# Patient Record
Sex: MALE | Race: WHITE | NOT HISPANIC OR LATINO | Employment: FULL TIME | URBAN - METROPOLITAN AREA
[De-identification: names, ages, dates, MRNs, and addresses within clinical notes are randomized per-mention and may not be internally consistent; named-entity substitution may affect disease eponyms.]

---

## 2017-11-27 DIAGNOSIS — Z00.00 HEALTH CARE MAINTENANCE: ICD-10-CM

## 2017-11-27 DIAGNOSIS — Z00.00 ANNUAL PHYSICAL EXAM: Primary | ICD-10-CM

## 2017-11-28 LAB
25(OH)D3+25(OH)D2 SERPL-MCNC: 18.9 NG/ML
ALBUMIN SERPL-MCNC: 4.4 G/DL (ref 3.5–5.2)
ALBUMIN/GLOB SERPL: 2.2 G/DL
ALP SERPL-CCNC: 43 U/L (ref 40–129)
ALT SERPL-CCNC: 23 U/L (ref 5–41)
AST SERPL-CCNC: 22 U/L (ref 5–40)
BASOPHILS # BLD AUTO: 0.02 10*3/MM3 (ref 0–0.2)
BASOPHILS NFR BLD AUTO: 0.5 % (ref 0–2)
BILIRUB SERPL-MCNC: 0.8 MG/DL (ref 0.2–1.2)
BUN SERPL-MCNC: 14 MG/DL (ref 6–20)
BUN/CREAT SERPL: 15.7 (ref 7–25)
CALCIUM SERPL-MCNC: 9.1 MG/DL (ref 8.6–10.5)
CHLORIDE SERPL-SCNC: 102 MMOL/L (ref 98–107)
CHOLEST SERPL-MCNC: 125 MG/DL (ref 0–200)
CO2 SERPL-SCNC: 27.5 MMOL/L (ref 22–29)
CREAT SERPL-MCNC: 0.89 MG/DL (ref 0.76–1.27)
EOSINOPHIL # BLD AUTO: 0.1 10*3/MM3 (ref 0.1–0.3)
EOSINOPHIL NFR BLD AUTO: 2.6 % (ref 0–4)
ERYTHROCYTE [DISTWIDTH] IN BLOOD BY AUTOMATED COUNT: 12.4 % (ref 11.5–14.5)
GFR SERPLBLD CREATININE-BSD FMLA CKD-EPI: 104 ML/MIN/1.73
GFR SERPLBLD CREATININE-BSD FMLA CKD-EPI: 126 ML/MIN/1.73
GLOBULIN SER CALC-MCNC: 2 GM/DL
GLUCOSE SERPL-MCNC: 83 MG/DL (ref 65–99)
HCT VFR BLD AUTO: 42.1 % (ref 42–52)
HDLC SERPL-MCNC: 63 MG/DL (ref 40–60)
HGB BLD-MCNC: 14.8 G/DL (ref 14–18)
IMM GRANULOCYTES # BLD: 0.01 10*3/MM3 (ref 0–0.03)
IMM GRANULOCYTES NFR BLD: 0.3 % (ref 0–0.5)
LDLC SERPL CALC-MCNC: 53 MG/DL (ref 0–100)
LYMPHOCYTES # BLD AUTO: 1.69 10*3/MM3 (ref 0.6–4.8)
LYMPHOCYTES NFR BLD AUTO: 43.6 % (ref 20–45)
MCH RBC QN AUTO: 29.2 PG (ref 27–31)
MCHC RBC AUTO-ENTMCNC: 35.2 G/DL (ref 31–37)
MCV RBC AUTO: 83 FL (ref 80–94)
MONOCYTES # BLD AUTO: 0.36 10*3/MM3 (ref 0–1)
MONOCYTES NFR BLD AUTO: 9.3 % (ref 3–8)
NEUTROPHILS # BLD AUTO: 1.7 10*3/MM3 (ref 1.5–8.3)
NEUTROPHILS NFR BLD AUTO: 43.7 % (ref 45–70)
NRBC BLD AUTO-RTO: 0 /100 WBC (ref 0–0)
PLATELET # BLD AUTO: 187 10*3/MM3 (ref 140–500)
POTASSIUM SERPL-SCNC: 4 MMOL/L (ref 3.5–5.2)
PROT SERPL-MCNC: 6.4 G/DL (ref 6–8.5)
RBC # BLD AUTO: 5.07 10*6/MM3 (ref 4.7–6.1)
SODIUM SERPL-SCNC: 141 MMOL/L (ref 136–145)
TRIGL SERPL-MCNC: 45 MG/DL (ref 0–150)
VLDLC SERPL CALC-MCNC: 9 MG/DL (ref 8–32)
WBC # BLD AUTO: 3.88 10*3/MM3 (ref 4.8–10.8)

## 2017-12-07 ENCOUNTER — OFFICE VISIT (OUTPATIENT)
Dept: FAMILY MEDICINE CLINIC | Facility: CLINIC | Age: 27
End: 2017-12-07

## 2017-12-07 VITALS
OXYGEN SATURATION: 99 % | WEIGHT: 151 LBS | HEIGHT: 70 IN | SYSTOLIC BLOOD PRESSURE: 120 MMHG | HEART RATE: 110 BPM | BODY MASS INDEX: 21.62 KG/M2 | DIASTOLIC BLOOD PRESSURE: 72 MMHG

## 2017-12-07 DIAGNOSIS — Z00.00 HEALTHCARE MAINTENANCE: Primary | ICD-10-CM

## 2017-12-07 DIAGNOSIS — Z00.00 ANNUAL PHYSICAL EXAM: ICD-10-CM

## 2017-12-07 DIAGNOSIS — E55.9 VITAMIN D DEFICIENCY: ICD-10-CM

## 2017-12-07 DIAGNOSIS — D72.819 LEUKOPENIA, UNSPECIFIED TYPE: ICD-10-CM

## 2017-12-07 LAB
BILIRUB BLD-MCNC: NEGATIVE MG/DL
CLARITY, POC: CLEAR
COLOR UR: YELLOW
GLUCOSE UR STRIP-MCNC: NEGATIVE MG/DL
KETONES UR QL: NEGATIVE
LEUKOCYTE EST, POC: NEGATIVE
NITRITE UR-MCNC: NEGATIVE MG/ML
PH UR: 7 [PH] (ref 5–8)
PROT UR STRIP-MCNC: NEGATIVE MG/DL
RBC # UR STRIP: NEGATIVE /UL
SP GR UR: 1.01 (ref 1–1.03)
UROBILINOGEN UR QL: NORMAL

## 2017-12-07 PROCEDURE — 81002 URINALYSIS NONAUTO W/O SCOPE: CPT | Performed by: FAMILY MEDICINE

## 2017-12-07 PROCEDURE — 99395 PREV VISIT EST AGE 18-39: CPT | Performed by: FAMILY MEDICINE

## 2017-12-07 NOTE — PROGRESS NOTES
Subjective   Rajesh Marrero is a 27 y.o. male with   Chief Complaint   Patient presents with   • Annual Exam   .    History of Present Illness   Patient presents for a complete physical.  At this time, he is otherwise without complaint. He is working full time at [employer] and continues to live at home with his parents.  Fasting labs have been performed prior to this visit.  The following portions of the patient's history were reviewed and updated as appropriate: allergies, current medications, past family history, past medical history, past social history, past surgical history and problem list.    Review of Systems   Endocrine:        Vitamin D Deficiency   All other systems reviewed and are negative.      Objective     Vitals:    12/07/17 1450   BP: 120/72   Pulse: 110   SpO2: 99%       BP Readings from Last 3 Encounters:   12/07/17 120/72   08/19/15 120/78      Wt Readings from Last 3 Encounters:   12/07/17 68.5 kg (151 lb)   08/19/15 66.7 kg (147 lb)      Office Visit on 12/07/2017   Component Date Value Ref Range Status   • Color 12/07/2017 Yellow  Yellow, Straw, Dark Yellow, Radha Final   • Clarity, UA 12/07/2017 Clear  Clear Final   • Glucose, UA 12/07/2017 Negative  Negative, 1000 mg/dL (3+) mg/dL Final   • Bilirubin 12/07/2017 Negative  Negative Final   • Ketones, UA 12/07/2017 Negative  Negative Final   • Specific Gravity  12/07/2017 1.010  1.005 - 1.030 Final   • Blood, UA 12/07/2017 Negative  Negative Final   • pH, Urine 12/07/2017 7.0  5.0 - 8.0 Final   • Protein, POC 12/07/2017 Negative  Negative mg/dL Final   • Urobilinogen, UA 12/07/2017 Normal  Normal Final   • Leukocytes 12/07/2017 Negative  Negative Final   • Nitrite, UA 12/07/2017 Negative  Negative Final   Orders Only on 11/27/2017   Component Date Value Ref Range Status   • WBC 11/28/2017 3.88* 4.80 - 10.80 10*3/mm3 Final   • RBC 11/28/2017 5.07  4.70 - 6.10 10*6/mm3 Final   • Hemoglobin 11/28/2017 14.8  14.0 - 18.0 g/dL Final   •  Hematocrit 11/28/2017 42.1  42.0 - 52.0 % Final   • MCV 11/28/2017 83.0  80.0 - 94.0 fL Final   • MCH 11/28/2017 29.2  27.0 - 31.0 pg Final   • MCHC 11/28/2017 35.2  31.0 - 37.0 g/dL Final   • RDW 11/28/2017 12.4  11.5 - 14.5 % Final   • Platelets 11/28/2017 187  140 - 500 10*3/mm3 Final   • Neutrophil Rel % 11/28/2017 43.7* 45.0 - 70.0 % Final   • Lymphocyte Rel % 11/28/2017 43.6  20.0 - 45.0 % Final   • Monocyte Rel % 11/28/2017 9.3* 3.0 - 8.0 % Final   • Eosinophil Rel % 11/28/2017 2.6  0.0 - 4.0 % Final   • Basophil Rel % 11/28/2017 0.5  0.0 - 2.0 % Final   • Neutrophils Absolute 11/28/2017 1.70  1.50 - 8.30 10*3/mm3 Final   • Lymphocytes Absolute 11/28/2017 1.69  0.60 - 4.80 10*3/mm3 Final   • Monocytes Absolute 11/28/2017 0.36  0.00 - 1.00 10*3/mm3 Final   • Eosinophils Absolute 11/28/2017 0.10  0.10 - 0.30 10*3/mm3 Final   • Basophils Absolute 11/28/2017 0.02  0.00 - 0.20 10*3/mm3 Final   • Immature Granulocyte Rel % 11/28/2017 0.3  0.0 - 0.5 % Final   • Immature Grans Absolute 11/28/2017 0.01  0.00 - 0.03 10*3/mm3 Final   • nRBC 11/28/2017 0.0  0.0 - 0.0 /100 WBC Final   • Glucose 11/28/2017 83  65 - 99 mg/dL Final   • BUN 11/28/2017 14  6 - 20 mg/dL Final   • Creatinine 11/28/2017 0.89  0.76 - 1.27 mg/dL Final   • eGFR Non African Am 11/28/2017 104  >60 mL/min/1.73 Final   • eGFR African Am 11/28/2017 126  >60 mL/min/1.73 Final   • BUN/Creatinine Ratio 11/28/2017 15.7  7.0 - 25.0 Final   • Sodium 11/28/2017 141  136 - 145 mmol/L Final   • Potassium 11/28/2017 4.0  3.5 - 5.2 mmol/L Final   • Chloride 11/28/2017 102  98 - 107 mmol/L Final   • Total CO2 11/28/2017 27.5  22.0 - 29.0 mmol/L Final   • Calcium 11/28/2017 9.1  8.6 - 10.5 mg/dL Final   • Total Protein 11/28/2017 6.4  6.0 - 8.5 g/dL Final   • Albumin 11/28/2017 4.40  3.50 - 5.20 g/dL Final   • Globulin 11/28/2017 2.0  gm/dL Final   • A/G Ratio 11/28/2017 2.2  g/dL Final   • Total Bilirubin 11/28/2017 0.8  0.2 - 1.2 mg/dL Final   • Alkaline  Phosphatase 11/28/2017 43  40 - 129 U/L Final   • AST (SGOT) 11/28/2017 22  5 - 40 U/L Final   • ALT (SGPT) 11/28/2017 23  5 - 41 U/L Final   • Total Cholesterol 11/28/2017 125  0 - 200 mg/dL Final   • Triglycerides 11/28/2017 45  0 - 150 mg/dL Final   • HDL Cholesterol 11/28/2017 63* 40 - 60 mg/dL Final   • VLDL Cholesterol 11/28/2017 9  8 - 32 mg/dL Final   • LDL Cholesterol  11/28/2017 53  0 - 100 mg/dL Final   • 25 Hydroxy, Vitamin D 11/28/2017 18.9  ng/mL Final    Comment: Reference Range for Total Vitamin D 25(OH)  Deficiency    <20.0 ng/mL  Insufficiency 21-29 ng/mL  Sufficiency   30-74 ng/mL         Physical Exam   Constitutional: He is oriented to person, place, and time. Vital signs are normal. He appears well-developed and well-nourished. No distress.   HENT:   Head: Normocephalic and atraumatic.   Right Ear: Hearing, tympanic membrane, external ear and ear canal normal.   Left Ear: Hearing, tympanic membrane, external ear and ear canal normal.   Nose: Nose normal.   Mouth/Throat: Uvula is midline, oropharynx is clear and moist and mucous membranes are normal.   Eyes: Conjunctivae, EOM and lids are normal. Pupils are equal, round, and reactive to light. No scleral icterus.   Fundoscopic exam:       The right eye shows no AV nicking, no exudate, no hemorrhage and no papilledema.        The left eye shows no AV nicking, no exudate, no hemorrhage and no papilledema.   Neck: Trachea normal and normal range of motion. Neck supple. No JVD present. No muscular tenderness present. Carotid bruit is not present. Normal range of motion present. No thyroid mass and no thyromegaly present.   Cardiovascular: Normal rate, regular rhythm, S1 normal, S2 normal, normal heart sounds, intact distal pulses and normal pulses.  PMI is not displaced.  Exam reveals no gallop and no friction rub.    No murmur heard.  Pulses:       Carotid pulses are 2+ on the right side, and 2+ on the left side.       Radial pulses are 2+ on the  right side, and 2+ on the left side.   Pulmonary/Chest: Effort normal and breath sounds normal. He has no decreased breath sounds. He has no wheezes. He has no rhonchi. He has no rales.   Abdominal: Soft. Bowel sounds are normal. He exhibits no distension and no mass. There is no hepatosplenomegaly. There is no tenderness. There is no rigidity, no rebound, no guarding and no CVA tenderness. No hernia. Hernia confirmed negative in the right inguinal area and confirmed negative in the left inguinal area.   Genitourinary: Testes normal and penis normal. Cremasteric reflex is present. Circumcised.   Musculoskeletal: Normal range of motion. He exhibits no edema, tenderness or deformity.   Lymphadenopathy:     He has no cervical adenopathy.   Neurological: He is alert and oriented to person, place, and time. He has normal strength and normal reflexes. He displays no tremor and normal reflexes. No cranial nerve deficit or sensory deficit. He exhibits normal muscle tone. He displays a negative Romberg sign. Coordination and gait normal.   Reflex Scores:       Bicep reflexes are 2+ on the right side and 2+ on the left side.       Brachioradialis reflexes are 2+ on the right side and 2+ on the left side.       Patellar reflexes are 2+ on the right side and 2+ on the left side.  Skin: Skin is warm and dry. No rash noted.   Psychiatric: He has a normal mood and affect. His speech is normal and behavior is normal. Judgment and thought content normal. Cognition and memory are normal.   Nursing note and vitals reviewed.      Assessment/Plan   Rajesh was seen today for annual exam.    Diagnoses and all orders for this visit:    Healthcare maintenance  -     POCT urinalysis dipstick, manual    Annual physical exam    Vitamin D deficiency    Leukopenia, unspecified type      Patient Instructions     Office Visit on 12/07/2017   Component Date Value Ref Range Status   • Color 12/07/2017 Yellow  Yellow, Straw, Dark Yellow, Radha  Final   • Clarity, UA 12/07/2017 Clear  Clear Final   • Glucose, UA 12/07/2017 Negative  Negative, 1000 mg/dL (3+) mg/dL Final   • Bilirubin 12/07/2017 Negative  Negative Final   • Ketones, UA 12/07/2017 Negative  Negative Final   • Specific Gravity  12/07/2017 1.010  1.005 - 1.030 Final   • Blood, UA 12/07/2017 Negative  Negative Final   • pH, Urine 12/07/2017 7.0  5.0 - 8.0 Final   • Protein, POC 12/07/2017 Negative  Negative mg/dL Final   • Urobilinogen, UA 12/07/2017 Normal  Normal Final   • Leukocytes 12/07/2017 Negative  Negative Final   • Nitrite, UA 12/07/2017 Negative  Negative Final   Orders Only on 11/27/2017   Component Date Value Ref Range Status   • WBC 11/28/2017 3.88* 4.80 - 10.80 10*3/mm3 Final   • RBC 11/28/2017 5.07  4.70 - 6.10 10*6/mm3 Final   • Hemoglobin 11/28/2017 14.8  14.0 - 18.0 g/dL Final   • Hematocrit 11/28/2017 42.1  42.0 - 52.0 % Final   • MCV 11/28/2017 83.0  80.0 - 94.0 fL Final   • MCH 11/28/2017 29.2  27.0 - 31.0 pg Final   • MCHC 11/28/2017 35.2  31.0 - 37.0 g/dL Final   • RDW 11/28/2017 12.4  11.5 - 14.5 % Final   • Platelets 11/28/2017 187  140 - 500 10*3/mm3 Final   • Neutrophil Rel % 11/28/2017 43.7* 45.0 - 70.0 % Final   • Lymphocyte Rel % 11/28/2017 43.6  20.0 - 45.0 % Final   • Monocyte Rel % 11/28/2017 9.3* 3.0 - 8.0 % Final   • Eosinophil Rel % 11/28/2017 2.6  0.0 - 4.0 % Final   • Basophil Rel % 11/28/2017 0.5  0.0 - 2.0 % Final   • Neutrophils Absolute 11/28/2017 1.70  1.50 - 8.30 10*3/mm3 Final   • Lymphocytes Absolute 11/28/2017 1.69  0.60 - 4.80 10*3/mm3 Final   • Monocytes Absolute 11/28/2017 0.36  0.00 - 1.00 10*3/mm3 Final   • Eosinophils Absolute 11/28/2017 0.10  0.10 - 0.30 10*3/mm3 Final   • Basophils Absolute 11/28/2017 0.02  0.00 - 0.20 10*3/mm3 Final   • Immature Granulocyte Rel % 11/28/2017 0.3  0.0 - 0.5 % Final   • Immature Grans Absolute 11/28/2017 0.01  0.00 - 0.03 10*3/mm3 Final   • nRBC 11/28/2017 0.0  0.0 - 0.0 /100 WBC Final   • Glucose 11/28/2017  83  65 - 99 mg/dL Final   • BUN 11/28/2017 14  6 - 20 mg/dL Final   • Creatinine 11/28/2017 0.89  0.76 - 1.27 mg/dL Final   • eGFR Non African Am 11/28/2017 104  >60 mL/min/1.73 Final   • eGFR African Am 11/28/2017 126  >60 mL/min/1.73 Final   • BUN/Creatinine Ratio 11/28/2017 15.7  7.0 - 25.0 Final   • Sodium 11/28/2017 141  136 - 145 mmol/L Final   • Potassium 11/28/2017 4.0  3.5 - 5.2 mmol/L Final   • Chloride 11/28/2017 102  98 - 107 mmol/L Final   • Total CO2 11/28/2017 27.5  22.0 - 29.0 mmol/L Final   • Calcium 11/28/2017 9.1  8.6 - 10.5 mg/dL Final   • Total Protein 11/28/2017 6.4  6.0 - 8.5 g/dL Final   • Albumin 11/28/2017 4.40  3.50 - 5.20 g/dL Final   • Globulin 11/28/2017 2.0  gm/dL Final   • A/G Ratio 11/28/2017 2.2  g/dL Final   • Total Bilirubin 11/28/2017 0.8  0.2 - 1.2 mg/dL Final   • Alkaline Phosphatase 11/28/2017 43  40 - 129 U/L Final   • AST (SGOT) 11/28/2017 22  5 - 40 U/L Final   • ALT (SGPT) 11/28/2017 23  5 - 41 U/L Final   • Total Cholesterol 11/28/2017 125  0 - 200 mg/dL Final   • Triglycerides 11/28/2017 45  0 - 150 mg/dL Final   • HDL Cholesterol 11/28/2017 63* 40 - 60 mg/dL Final   • VLDL Cholesterol 11/28/2017 9  8 - 32 mg/dL Final   • LDL Cholesterol  11/28/2017 53  0 - 100 mg/dL Final   • 25 Hydroxy, Vitamin D 11/28/2017 18.9  ng/mL Final    Comment: Reference Range for Total Vitamin D 25(OH)  Deficiency    <20.0 ng/mL  Insufficiency 21-29 ng/mL  Sufficiency   30-74 ng/mL       Start OTC Vitamin D3 at 2000 IU per day to improve Vitamin D Deficiency.         Return in about 1 year (around 12/7/2018) for Annual.        Scribed for Cooper Aguilar MD by Onofre Ramirez. 12/07/2017    I, Cooper Aguilar MD personally performed the services described in this documentation, as scribed by Onofre Ramirez in my presence, and it is both accurate and complete

## 2017-12-07 NOTE — PATIENT INSTRUCTIONS
Office Visit on 12/07/2017   Component Date Value Ref Range Status   • Color 12/07/2017 Yellow  Yellow, Straw, Dark Yellow, Radha Final   • Clarity, UA 12/07/2017 Clear  Clear Final   • Glucose, UA 12/07/2017 Negative  Negative, 1000 mg/dL (3+) mg/dL Final   • Bilirubin 12/07/2017 Negative  Negative Final   • Ketones, UA 12/07/2017 Negative  Negative Final   • Specific Gravity  12/07/2017 1.010  1.005 - 1.030 Final   • Blood, UA 12/07/2017 Negative  Negative Final   • pH, Urine 12/07/2017 7.0  5.0 - 8.0 Final   • Protein, POC 12/07/2017 Negative  Negative mg/dL Final   • Urobilinogen, UA 12/07/2017 Normal  Normal Final   • Leukocytes 12/07/2017 Negative  Negative Final   • Nitrite, UA 12/07/2017 Negative  Negative Final   Orders Only on 11/27/2017   Component Date Value Ref Range Status   • WBC 11/28/2017 3.88* 4.80 - 10.80 10*3/mm3 Final   • RBC 11/28/2017 5.07  4.70 - 6.10 10*6/mm3 Final   • Hemoglobin 11/28/2017 14.8  14.0 - 18.0 g/dL Final   • Hematocrit 11/28/2017 42.1  42.0 - 52.0 % Final   • MCV 11/28/2017 83.0  80.0 - 94.0 fL Final   • MCH 11/28/2017 29.2  27.0 - 31.0 pg Final   • MCHC 11/28/2017 35.2  31.0 - 37.0 g/dL Final   • RDW 11/28/2017 12.4  11.5 - 14.5 % Final   • Platelets 11/28/2017 187  140 - 500 10*3/mm3 Final   • Neutrophil Rel % 11/28/2017 43.7* 45.0 - 70.0 % Final   • Lymphocyte Rel % 11/28/2017 43.6  20.0 - 45.0 % Final   • Monocyte Rel % 11/28/2017 9.3* 3.0 - 8.0 % Final   • Eosinophil Rel % 11/28/2017 2.6  0.0 - 4.0 % Final   • Basophil Rel % 11/28/2017 0.5  0.0 - 2.0 % Final   • Neutrophils Absolute 11/28/2017 1.70  1.50 - 8.30 10*3/mm3 Final   • Lymphocytes Absolute 11/28/2017 1.69  0.60 - 4.80 10*3/mm3 Final   • Monocytes Absolute 11/28/2017 0.36  0.00 - 1.00 10*3/mm3 Final   • Eosinophils Absolute 11/28/2017 0.10  0.10 - 0.30 10*3/mm3 Final   • Basophils Absolute 11/28/2017 0.02  0.00 - 0.20 10*3/mm3 Final   • Immature Granulocyte Rel % 11/28/2017 0.3  0.0 - 0.5 % Final   • Immature  Grans Absolute 11/28/2017 0.01  0.00 - 0.03 10*3/mm3 Final   • nRBC 11/28/2017 0.0  0.0 - 0.0 /100 WBC Final   • Glucose 11/28/2017 83  65 - 99 mg/dL Final   • BUN 11/28/2017 14  6 - 20 mg/dL Final   • Creatinine 11/28/2017 0.89  0.76 - 1.27 mg/dL Final   • eGFR Non African Am 11/28/2017 104  >60 mL/min/1.73 Final   • eGFR African Am 11/28/2017 126  >60 mL/min/1.73 Final   • BUN/Creatinine Ratio 11/28/2017 15.7  7.0 - 25.0 Final   • Sodium 11/28/2017 141  136 - 145 mmol/L Final   • Potassium 11/28/2017 4.0  3.5 - 5.2 mmol/L Final   • Chloride 11/28/2017 102  98 - 107 mmol/L Final   • Total CO2 11/28/2017 27.5  22.0 - 29.0 mmol/L Final   • Calcium 11/28/2017 9.1  8.6 - 10.5 mg/dL Final   • Total Protein 11/28/2017 6.4  6.0 - 8.5 g/dL Final   • Albumin 11/28/2017 4.40  3.50 - 5.20 g/dL Final   • Globulin 11/28/2017 2.0  gm/dL Final   • A/G Ratio 11/28/2017 2.2  g/dL Final   • Total Bilirubin 11/28/2017 0.8  0.2 - 1.2 mg/dL Final   • Alkaline Phosphatase 11/28/2017 43  40 - 129 U/L Final   • AST (SGOT) 11/28/2017 22  5 - 40 U/L Final   • ALT (SGPT) 11/28/2017 23  5 - 41 U/L Final   • Total Cholesterol 11/28/2017 125  0 - 200 mg/dL Final   • Triglycerides 11/28/2017 45  0 - 150 mg/dL Final   • HDL Cholesterol 11/28/2017 63* 40 - 60 mg/dL Final   • VLDL Cholesterol 11/28/2017 9  8 - 32 mg/dL Final   • LDL Cholesterol  11/28/2017 53  0 - 100 mg/dL Final   • 25 Hydroxy, Vitamin D 11/28/2017 18.9  ng/mL Final    Comment: Reference Range for Total Vitamin D 25(OH)  Deficiency    <20.0 ng/mL  Insufficiency 21-29 ng/mL  Sufficiency   30-74 ng/mL       Start OTC Vitamin D3 at 2000 IU per day to improve Vitamin D Deficiency.

## 2017-12-10 PROBLEM — E55.9 VITAMIN D DEFICIENCY: Status: ACTIVE | Noted: 2017-12-10

## 2017-12-10 PROBLEM — D72.819 LEUKOPENIA: Status: ACTIVE | Noted: 2017-12-10

## 2018-01-31 ENCOUNTER — OFFICE VISIT (OUTPATIENT)
Dept: FAMILY MEDICINE CLINIC | Facility: CLINIC | Age: 28
End: 2018-01-31

## 2018-01-31 VITALS
BODY MASS INDEX: 21.76 KG/M2 | OXYGEN SATURATION: 99 % | HEIGHT: 70 IN | HEART RATE: 110 BPM | DIASTOLIC BLOOD PRESSURE: 60 MMHG | TEMPERATURE: 98.9 F | WEIGHT: 152 LBS | SYSTOLIC BLOOD PRESSURE: 122 MMHG

## 2018-01-31 DIAGNOSIS — S70.02XD CONTUSION OF LEFT HIP, SUBSEQUENT ENCOUNTER: ICD-10-CM

## 2018-01-31 DIAGNOSIS — S70.212D: ICD-10-CM

## 2018-01-31 DIAGNOSIS — S43.51XD SPRAIN OF RIGHT ACROMIOCLAVICULAR LIGAMENT, SUBSEQUENT ENCOUNTER: Primary | ICD-10-CM

## 2018-01-31 PROCEDURE — 99214 OFFICE O/P EST MOD 30 MIN: CPT | Performed by: FAMILY MEDICINE
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
## 2018-02-12 ENCOUNTER — OFFICE VISIT (OUTPATIENT)
Dept: ORTHOPEDIC SURGERY | Facility: CLINIC | Age: 28
End: 2018-02-12

## 2018-02-12 VITALS
DIASTOLIC BLOOD PRESSURE: 57 MMHG | HEIGHT: 70 IN | HEART RATE: 76 BPM | BODY MASS INDEX: 21.47 KG/M2 | SYSTOLIC BLOOD PRESSURE: 117 MMHG | WEIGHT: 150 LBS

## 2018-02-12 DIAGNOSIS — S39.011A STRAIN OF ABDOMINAL MUSCLE, INITIAL ENCOUNTER: ICD-10-CM

## 2018-02-12 DIAGNOSIS — R52 PAIN: Primary | ICD-10-CM

## 2018-02-12 DIAGNOSIS — S43.101A ACROMIOCLAVICULAR SEPARATION, RIGHT, INITIAL ENCOUNTER: ICD-10-CM

## 2018-02-12 PROCEDURE — 72170 X-RAY EXAM OF PELVIS: CPT | Performed by: ORTHOPAEDIC SURGERY

## 2018-02-12 PROCEDURE — 73030 X-RAY EXAM OF SHOULDER: CPT | Performed by: ORTHOPAEDIC SURGERY

## 2018-02-12 PROCEDURE — 99203 OFFICE O/P NEW LOW 30 MIN: CPT | Performed by: ORTHOPAEDIC SURGERY

## 2018-02-12 RX ORDER — CYCLOBENZAPRINE HCL 5 MG
5 TABLET ORAL 3 TIMES DAILY PRN
COMMUNITY
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
## 2018-02-21 ENCOUNTER — HOSPITAL ENCOUNTER (OUTPATIENT)
Dept: MRI IMAGING | Facility: HOSPITAL | Age: 28
Discharge: HOME | End: 2018-02-21
Attending: ORTHOPAEDIC SURGERY | Admitting: ORTHOPAEDIC SURGERY

## 2018-02-21 DIAGNOSIS — S43.101A ACROMIOCLAVICULAR SEPARATION, RIGHT, INITIAL ENCOUNTER: ICD-10-CM

## 2018-02-21 DIAGNOSIS — R52 PAIN: ICD-10-CM

## 2018-02-21 PROCEDURE — 73221 MRI JOINT UPR EXTREM W/O DYE: CPT

## 2018-02-26 ENCOUNTER — OFFICE VISIT (OUTPATIENT)
Dept: ORTHOPEDIC SURGERY | Facility: CLINIC | Age: 28
End: 2018-02-26

## 2018-02-26 DIAGNOSIS — S43.101A ACROMIOCLAVICULAR SEPARATION, RIGHT, INITIAL ENCOUNTER: Primary | ICD-10-CM

## 2018-02-26 PROCEDURE — 99213 OFFICE O/P EST LOW 20 MIN: CPT | Performed by: ORTHOPAEDIC SURGERY

## 2018-02-26 NOTE — PROGRESS NOTES
Subjective:grade 2-3  Separation right acromioclavicular joint     Patient ID: Rajesh Marrero is a 27 y.o. male.    Chief Complaint:    History of Present Illness 27-year-old male returns with results of MRI of the right shoulder.  I personally reviewed the MRI images and oriented which shows a grade 2-3 separation with complete disruption of the acromial clavicular joint partial tearing of the coracoclavicular ligament.  He continues to have moderate discomfort with any type of activity involving that shoulder.     Social History     Occupational History   • Not on file.     Social History Main Topics   • Smoking status: Never Smoker   • Smokeless tobacco: Current User     Types: Chew   • Alcohol use Yes      Comment: 6 drinks per week   • Drug use: No   • Sexual activity: Not on file      Review of Systems   Constitutional: Negative for chills, diaphoresis, fever and unexpected weight change.   HENT: Negative for hearing loss, nosebleeds, sore throat and tinnitus.    Eyes: Negative for pain and visual disturbance.   Respiratory: Negative for cough, shortness of breath and wheezing.    Cardiovascular: Negative for chest pain and palpitations.   Gastrointestinal: Negative for abdominal pain, diarrhea, nausea and vomiting.   Endocrine: Negative for cold intolerance, heat intolerance and polydipsia.   Genitourinary: Negative for difficulty urinating, dysuria and hematuria.   Musculoskeletal: Positive for arthralgias.   Skin: Negative for rash and wound.   Allergic/Immunologic: Negative for environmental allergies.   Neurological: Negative for dizziness, syncope and numbness.   Hematological: Does not bruise/bleed easily.   Psychiatric/Behavioral: Negative for dysphoric mood and sleep disturbance. The patient is not nervous/anxious.    All other systems reviewed and are negative.          Objective:     Ortho Exam  patient is alert and oriented ×3.  Review the results of the digital exam and the MRI with the  patient and his father.  Again he has elevation of the clavicle and this pain to palpation over the acromioclavicular joint.  There is no motor deficit no sensory loss.  Distal ulnar median nerve function is intact.  He has near full range of motion the shoulder but there is pain with abduction and extension against resistance is tenderness over the joint itself to palpation.  No sensory loss.  Deltoid and biceps function 5 over 5.  Skin is cool to touch.    Assessment:       1. Acromioclavicular separation, right, initial encounter          Plan:       spent over 10 minutes with  the patient and the father reviewing the MRI findings with diagram in the office showing him the anatomy.  He persists in having pain and discomfort in that shoulder.  who told her decision to proceed with surgery would be elected he still having pain discomfort and I felt that a 27 the best served with surgical reconstruction of that ligament.  I discussed the surgery with them risk and benefits length of rehabilitation recovery.  Total it had any questions to seek a second opinion.  Timing to proceed again with his decision to make.  The call back if they have any questions or fail decided to proceed with surgery under my direction.

## 2018-05-15 ENCOUNTER — TRANSCRIBE ORDERS (OUTPATIENT)
Dept: PHYSICAL THERAPY | Facility: CLINIC | Age: 28
End: 2018-05-15

## 2018-05-15 DIAGNOSIS — Z98.890 S/P SHOULDER SURGERY: ICD-10-CM

## 2018-05-15 DIAGNOSIS — S43.101A SEPARATION OF RIGHT ACROMIOCLAVICULAR JOINT, INITIAL ENCOUNTER: Primary | ICD-10-CM

## 2018-05-17 ENCOUNTER — TREATMENT (OUTPATIENT)
Dept: PHYSICAL THERAPY | Facility: CLINIC | Age: 28
End: 2018-05-17

## 2018-05-17 DIAGNOSIS — Z98.890 STATUS POST AC JOINT RECONSTRUCTION: Primary | ICD-10-CM

## 2018-05-17 PROCEDURE — 97161 PT EVAL LOW COMPLEX 20 MIN: CPT | Performed by: PHYSICAL THERAPIST

## 2018-05-17 PROCEDURE — 97110 THERAPEUTIC EXERCISES: CPT | Performed by: PHYSICAL THERAPIST

## 2018-05-17 PROCEDURE — 97140 MANUAL THERAPY 1/> REGIONS: CPT | Performed by: PHYSICAL THERAPIST
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
## 2018-05-23 ENCOUNTER — TREATMENT (OUTPATIENT)
Dept: PHYSICAL THERAPY | Facility: CLINIC | Age: 28
End: 2018-05-23

## 2018-05-23 DIAGNOSIS — Z98.890 STATUS POST AC JOINT RECONSTRUCTION: Primary | ICD-10-CM

## 2018-05-23 PROCEDURE — 97110 THERAPEUTIC EXERCISES: CPT | Performed by: PHYSICAL THERAPIST

## 2018-05-23 PROCEDURE — 97140 MANUAL THERAPY 1/> REGIONS: CPT | Performed by: PHYSICAL THERAPIST

## 2018-05-23 NOTE — PROGRESS NOTES
Physical Therapy Daily Progress Note    Time In 15:05  Time Out 15:38    Visit # : 2  Rajesh Marrero reports: shoulder has been doing a lot better since the first visit. Pt states when he sleeps on it it'll be sore, but other than that not really having any other issues.      Subjective     Objective   See Exercise, Manual, and Modality Logs for complete treatment.       Assessment & Plan     Assessment  Assessment details: Pt is doing well with all exercises and has full PROM all planes with minimal discomfort.  Pt tolerated all new strengthening exercises without difficulty.  Will continue to progress pt per pt's tolerance and as protocol advises.  Will continue to see pt 1-2x/week for strengthening and modalities PRN for pain.          Progress per Plan of Care           Manual Therapy:    8     mins  79466;  Therapeutic Exercise:   15      mins  60431;     Neuromuscular Alicja:        mins  95632;    Therapeutic Activity:          mins  14373;     Gait Training:           mins  59261;     Ultrasound:          mins  29027;    Electrical Stimulation:         mins  42671 ( );  Dry Needling          mins self-pay    Timed Treatment:  23    mins   Total Treatment:     33   mins    Alysha Pitt, PT  Physical Therapist

## 2018-05-29 ENCOUNTER — TREATMENT (OUTPATIENT)
Dept: PHYSICAL THERAPY | Facility: CLINIC | Age: 28
End: 2018-05-29

## 2018-05-29 DIAGNOSIS — Z98.890 STATUS POST AC JOINT RECONSTRUCTION: Primary | ICD-10-CM

## 2018-05-29 PROCEDURE — 97530 THERAPEUTIC ACTIVITIES: CPT | Performed by: PHYSICAL THERAPIST

## 2018-05-29 PROCEDURE — 97110 THERAPEUTIC EXERCISES: CPT | Performed by: PHYSICAL THERAPIST

## 2018-05-29 PROCEDURE — 97140 MANUAL THERAPY 1/> REGIONS: CPT | Performed by: PHYSICAL THERAPIST

## 2018-05-29 NOTE — PROGRESS NOTES
Physical Therapy Daily Progress Note    Time In 15:12  Time Out 16:05    Visit # : 3  Rajesh Marrero reports: it is a little sore today, but think I slept on it last night.    Subjective     Objective   See Exercise, Manual, and Modality Logs for complete treatment.       Assessment & Plan     Assessment  Assessment details: Pt is doing well with all exercises and has full PROM all planes with minimal discomfort.  Pt tolerated all new strengthening exercises without difficulty.  Will continue to progress pt per pt's tolerance and as protocol advises.  Will continue to see pt 1-2x/week for strengthening and modalities PRN for pain.          Progress per Plan of Care           Manual Therapy:    8     mins  46924;  Therapeutic Exercise:   15      mins  74290;     Neuromuscular Alicja:        mins  25093;    Therapeutic Activity:    15      mins  46423;     Gait Training:           mins  47115;     Ultrasound:          mins  03371;    Electrical Stimulation:         mins  82516 ( );  Dry Needling          mins self-pay    Timed Treatment:  38    mins   Total Treatment:     53   mins    Alysha Pitt, PT   Physical Therapist

## 2018-06-01 ENCOUNTER — TREATMENT (OUTPATIENT)
Dept: PHYSICAL THERAPY | Facility: CLINIC | Age: 28
End: 2018-06-01

## 2018-06-01 DIAGNOSIS — Z98.890 STATUS POST AC JOINT RECONSTRUCTION: Primary | ICD-10-CM

## 2018-06-01 PROCEDURE — 97110 THERAPEUTIC EXERCISES: CPT | Performed by: PHYSICAL THERAPIST

## 2018-06-01 NOTE — PROGRESS NOTES
Physical Therapy Daily Progress Note    Time In 16:42  Time Out 17:25    Visit # : 4  Rajesh Marrero reports: his shoulder is pretty good, but was a little sore after last visit.      Subjective     Objective   See Exercise, Manual, and Modality Logs for complete treatment.       Assessment & Plan     Assessment  Assessment details: Pt continues to respond well to treatment with some soreness from the new strengthening exercises.  Pt with good AROM all planes, but c/o some tightness after doing strengthening exercises.  Will continue to push strengthening exercises 2x/week.          Progress per Plan of Care           Manual Therapy:         mins  18805;  Therapeutic Exercise:   30      mins  39284;     Neuromuscular Alicja:        mins  70197;    Therapeutic Activity:          mins  71812;     Gait Training:           mins  87320;     Ultrasound:          mins  74020;    Electrical Stimulation:         mins  99100 ( );  Dry Needling          mins self-pay    Timed Treatment:   30  mins   Total Treatment:     43   mins    Alysha Pitt, PT  Physical Therapist

## 2018-06-07 ENCOUNTER — TREATMENT (OUTPATIENT)
Dept: PHYSICAL THERAPY | Facility: CLINIC | Age: 28
End: 2018-06-07

## 2018-06-07 DIAGNOSIS — Z98.890 STATUS POST AC JOINT RECONSTRUCTION: Primary | ICD-10-CM

## 2018-06-07 PROCEDURE — 97110 THERAPEUTIC EXERCISES: CPT | Performed by: PHYSICAL THERAPIST

## 2018-06-07 NOTE — PROGRESS NOTES
Physical Therapy Daily Progress Note    Time In 14:35  Time Out 15:22    Visit # : 5  Rajesh Holderjami reports: no issues or c/o of right shoulder pain since last treatment.      Subjective     Objective       Tenderness     Additional Tenderness Details  Pt denies having any point tenderness over the right AC joint     Active Range of Motion     Right Shoulder   Flexion: 155 degrees   Abduction: 170 degrees   External rotation 90°: 90 degrees  Internal rotation 90°: 71 degrees     Passive Range of Motion     Right Shoulder   Flexion: WFL  Abduction: WFL  External rotation 90°: WFL  Internal rotation 90°: WFL     See Exercise, Manual, and Modality Logs for complete treatment.       Assessment & Plan     Assessment  Assessment details: Pt is progressing well to treatment with good PROM and AROM all planes.  Pt denies any tenderness, but needs RC and scapular strengthening for lifting shoulder level and above.  Pt tolerated all increases in strengthening exercises well, but fatigues easily during body blade exercise.  Will continue to see 1-2x/week for strengthening.          Progress per Plan of Care           Manual Therapy:    5     mins  21316;  Therapeutic Exercise:   30      mins  17283;     Neuromuscular Alicja:        mins  21795;    Therapeutic Activity:          mins  50601;     Gait Training:           mins  68195;     Ultrasound:          mins  38146;    Electrical Stimulation:         mins  61771 ( );  Dry Needling          mins self-pay    Timed Treatment:  35    mins   Total Treatment:    47    mins    Alysha Pitt, PT  Physical Therapist

## 2018-06-12 ENCOUNTER — TREATMENT (OUTPATIENT)
Dept: PHYSICAL THERAPY | Facility: CLINIC | Age: 28
End: 2018-06-12

## 2018-06-12 DIAGNOSIS — Z98.890 STATUS POST AC JOINT RECONSTRUCTION: Primary | ICD-10-CM

## 2018-06-12 PROCEDURE — 97110 THERAPEUTIC EXERCISES: CPT | Performed by: PHYSICAL THERAPIST

## 2018-06-12 NOTE — PROGRESS NOTES
Physical Therapy Daily Progress Note    Time In 15:07  Time Out 16:02    Visit # : 6  Rajesh Marrero reports: the shoulder is doing good, but the shoulder was a little sore after last visit.      Subjective     Objective   See Exercise, Manual, and Modality Logs for complete treatment.       Assessment & Plan     Assessment  Assessment details: Pt is progressing well to treatment with good PROM and AROM all planes.  Pt denies any tenderness, but needs RC and scapular strengthening for lifting shoulder level and above.  Pt is tolerating all strengthening exercises well, but fatigues easily during body blade exercise.  Will continue to see 1-2x/week for strengthening.          Progress per Plan of Care           Manual Therapy:    5     mins  60152;  Therapeutic Exercise:   35      mins  73994;     Neuromuscular Alicja:        mins  53100;    Therapeutic Activity:          mins  30328;     Gait Training:           mins  34069;     Ultrasound:          mins  68508;    Electrical Stimulation:         mins  07447 ( );  Dry Needling          mins self-pay    Timed Treatment:  40    mins   Total Treatment:     55   mins    Alysha Pitt, PT  Physical Therapist

## 2018-06-19 ENCOUNTER — TREATMENT (OUTPATIENT)
Dept: PHYSICAL THERAPY | Facility: CLINIC | Age: 28
End: 2018-06-19

## 2018-06-19 DIAGNOSIS — Z98.890 STATUS POST AC JOINT RECONSTRUCTION: Primary | ICD-10-CM

## 2018-06-19 PROCEDURE — 97110 THERAPEUTIC EXERCISES: CPT | Performed by: PHYSICAL THERAPIST

## 2018-06-19 NOTE — PROGRESS NOTES
Physical Therapy Daily Progress Note    Time In 15:05  Time Out 15:49    Visit # : 7  Rajesh Marrero reports: his shoulder is doing pretty good.  Pt was wondering when he could lift weights again?  Pt states he is not sure how much longer he will be able to come secondary to having to work 6 days/week.      Subjective     Objective       Strength/Myotome Testing     Right Shoulder     Planes of Motion   Flexion: 5   Abduction: 4+   External rotation at 0°: 4+   Internal rotation at 0°: 5     Isolated Muscles   Biceps: 5   Supraspinatus: 5      See Exercise, Manual, and Modality Logs for complete treatment.       Assessment & Plan     Assessment  Assessment details: Pt is progressing well with treatment.  Pt with good ROM and improving strength.  Pt still has weakness with body blade and ER.  Pt has returned to work, so can only come once per week.  Pt will pt one more time prior to seeing MD.  Plan to discharge to [facility] after next week.          Anticipate DC next Visit           Manual Therapy:         mins  43793;  Therapeutic Exercise:   30      mins  86458;     Neuromuscular Alicja:        mins  31300;    Therapeutic Activity:          mins  28444;     Gait Training:           mins  10944;     Ultrasound:          mins  52286;    Electrical Stimulation:         mins  52608 ( );  Dry Needling          mins self-pay    Timed Treatment:  30    mins   Total Treatment:    44    mins    Alysha Pitt, PT  Physical Therapist

## 2018-06-26 ENCOUNTER — TREATMENT (OUTPATIENT)
Dept: PHYSICAL THERAPY | Facility: CLINIC | Age: 28
End: 2018-06-26

## 2018-06-26 PROCEDURE — 97110 THERAPEUTIC EXERCISES: CPT | Performed by: PHYSICAL THERAPIST

## 2018-06-26 NOTE — PROGRESS NOTES
Physical Therapy Progress Note  6/26/2018  RAMONA Nazario    Re: Rajesh Marrero    Time In 15:00  Time Out 15:47  ________________________________________________________________    Mr. Rajesh Marrero, Patient seen for 8 sessions.  Treatment has consisted of: PROM, AAROM, AROM, RROM per protocol and received cold pack after exercises.  Pt was instructed in a HEP.      S: Mr. Rajesh Marrero states: no problems with the shoulder and rates his pain < a 1/10 with normal daily activities.  Pt states his shoulder is sore after PT, but denies having any increased pain.      Subjective     Objective       Tenderness     Additional Tenderness Details  Pt with a slight soreness/tenderness with palpation lateral to the right acromion.      Active Range of Motion   Left Shoulder   Normal active range of motion    Right Shoulder   Normal active range of motion    Strength/Myotome Testing     Left Shoulder     Planes of Motion   Flexion: 5   Extension: 5   Abduction: 5   External rotation at 0°: 5   Internal rotation at 0°: 5     Isolated Muscles   Biceps: 5   Lower trapezius: 4   Middle trapezius: 4+   Rhomboids: 5   Serratus anterior: 4+   Supraspinatus: 5   Triceps: 5   Upper trapezius: 5     Right Shoulder     Planes of Motion   Flexion: 5   Extension: 5   Abduction: 5   External rotation at 0°: 4+   Internal rotation at 0°: 5     Isolated Muscles   Biceps: 5   Lower trapezius: 4   Middle trapezius: 4+   Rhomboids: 5   Serratus anterior: 4+   Supraspinatus: 5   Triceps: 5   Upper trapezius: 5      See Exercise, Manual, and Modality Logs for complete treatment.       Assessment & Plan     Assessment  Assessment details: Pt is doing well with good ROM, decreased pain, slight tenderness, and improving strength.  Feel pt could continue strengthening as a HEP and recommend discharge from PT at this time.          Other           Manual Therapy:         mins  89172;  Therapeutic Exercise:    35     mins   09706;     Neuromuscular Alicja:        mins  82343;    Therapeutic Activity:          mins  73764;     Gait Training:           mins  49886;     Ultrasound:          mins  19092;    Electrical Stimulation:         mins  27165 ( );  Dry Needling          mins self-pay    Timed Treatment:   35   mins   Total Treatment:     47   mins    Alysha Pitt, PT  Physical Therapist

## 2018-08-08 ENCOUNTER — DOCUMENTATION (OUTPATIENT)
Dept: PHYSICAL THERAPY | Facility: CLINIC | Age: 28
End: 2018-08-08

## 2018-08-08 NOTE — PROGRESS NOTES
Discharge Summary  Discharge Summary from Physical Therapy Report      Dates  PT visit: 5/17/2018-6/26/2018  Number of Visits: 8     Discharge Status of Patient: See MD Note dated 6/26/2018 for objective measurements.      Goals: All Met    Discharge Plan: Continue with current home exercise program as instructed    Comments Pt went back to MD and recommended discharge with pt continuing his HEP.      Date of Discharge 8/8/2018        Alysha Pitt, PT  Physical Therapist